# Patient Record
(demographics unavailable — no encounter records)

---

## 2024-12-30 NOTE — ASSESSMENT
[FreeTextEntry1] : 34 yo F with right wrist and hip pain s/p fall walking her dog 6 days ago, +tingling intermittently into radial 3 digits  discussed cons mgmt - rest, ice, NSAIDs prn thumb spica splint for right wrist wound care for abrasions on hand rto 2-3 weeks with hand specialist if pain/tingling persists

## 2024-12-30 NOTE — IMAGING
[Right] : right hip [AP] : anteroposterior [Lateral] : lateral [There are no fractures, subluxations or dislocations. No significant abnormalities are seen] : There are no fractures, subluxations or dislocations. No significant abnormalities are seen [de-identified] : right hand  superficial abrasion over thenar eminence and mid palm without signs of infection, minimal swelling, no ecchymosis, no deformity. ttp 1st ext compartment with +finkelstein's  full finger ROM with no fingertip to palm distance or extension lag full wrist ROM +phalens at the wrist  Motor and sensory intact  right hip large ecchymosis lateral hip over greater troch with ttp No buttock or groin tenderness to palpation Full range of motion with no pain 5/5 hip flexion, extension abduction and adduction Negative impingement; no groin pain with hip rotation Motor and sensory intact distally +2 DP and PT pulses mildly antalgic gait

## 2024-12-30 NOTE — HISTORY OF PRESENT ILLNESS
[6] : 6 [5] : 5 [Dull/Aching] : dull/aching [Sharp] : sharp [Intermittent] : intermittent [Nothing helps with pain getting better] : Nothing helps with pain getting better [] : no [FreeTextEntry1] : RT Wrist, rt hip [FreeTextEntry5] : RT Wrist and hip injury, Dog dragged her down fell injured her Wrist. 12/24. PT has a big Brusie on her RT Hip, Cuts and scrapes on hand and wrist. occasional wrist swelling and tingling in radial 3 digits. h/o softball injury many years ago with ulnar nerve damage but symptoms unchanged.

## 2025-03-27 NOTE — PHYSICAL EXAM
[Left] : left knee [5___] : hamstring 5[unfilled]/5 [Positive] : positive Stef [] : patient ambulates without assistive device [TWNoteComboBox7] : flexion 120 degrees

## 2025-03-27 NOTE — HISTORY OF PRESENT ILLNESS
[Dull/Aching] : dull/aching [Sharp] : sharp [Throbbing] : throbbing [Constant] : constant [Heat] : heat [de-identified] : 33yo F with left-sided lower back and left knee pain since sustaining a fall in December 2024. She states she had a flare up of pain yesterday with no new injury. Notes a tightness in the lower back. She also notes "crunchiness" in the left knee. States the knee occasionally feels unsteady. Notes occasional pain radiating down the leg, and numbness in the lower leg  [] : no [FreeTextEntry1] : lower left side of back, left hip, left knee [FreeTextEntry3] : 3/26/25 [FreeTextEntry5] : injury from falling in December [de-identified] : movement [de-identified] : injury from 12/24

## 2025-03-27 NOTE — ASSESSMENT
[FreeTextEntry1] : MDP and cyclobenzaprine rx sent. Instructed on No NSAIDs while on pack  Will obtain MRI L knee to eval for LMT.  f/up after MRI

## 2025-04-09 NOTE — PHYSICAL EXAM
[Positive] : positive Stef [Left] : left hip [5___] : adduction 5[unfilled]/5 [2+] : posterior tibialis pulse: 2+ [] : no erythema [TWNoteComboBox7] : flexion 120 degrees

## 2025-04-09 NOTE — DISCUSSION/SUMMARY
[de-identified] : The patient has one or more conditions that would benefit from physical therapy.  The physical therapy is requested to improve any deficit in pain, range of motion, strength and other problems in the affected body part(s) as noted in the physical examination above.  A prescription for physical therapy 2 - 3 times per week for 6 weeks was prescribed for the following body parts. Left knee and hip.   The patient's orthopaedic condition warrants consideration of intermittent use of over-the-counter medications such as advil, alleve, tylenol and similar agents.  The patient is aware to use the medications only as directed on the bottle and should contact a physician should they encounter any problems.  The patient's orthopaedic condition(s) warrants consideration of consistent or intermittent use of a prescription strength non-steroidal anti-inflammatory medication (MOBIC 15 MG PO QDAY PRN).  This medication is associated with risks including but not limited to gastrointestinal irritation, kidney damage, hypertension, and bleeding.    Although clinically indicated, the patient defers taking the medication at this time.  Follow up in 4-6 weeks.

## 2025-04-09 NOTE — DISCUSSION/SUMMARY
[de-identified] : The patient has one or more conditions that would benefit from physical therapy.  The physical therapy is requested to improve any deficit in pain, range of motion, strength and other problems in the affected body part(s) as noted in the physical examination above.  A prescription for physical therapy 2 - 3 times per week for 6 weeks was prescribed for the following body parts. Left knee and hip.   The patient's orthopaedic condition warrants consideration of intermittent use of over-the-counter medications such as advil, alleve, tylenol and similar agents.  The patient is aware to use the medications only as directed on the bottle and should contact a physician should they encounter any problems.  The patient's orthopaedic condition(s) warrants consideration of consistent or intermittent use of a prescription strength non-steroidal anti-inflammatory medication (MOBIC 15 MG PO QDAY PRN).  This medication is associated with risks including but not limited to gastrointestinal irritation, kidney damage, hypertension, and bleeding.    Although clinically indicated, the patient defers taking the medication at this time.  Follow up in 4-6 weeks.

## 2025-04-09 NOTE — HISTORY OF PRESENT ILLNESS
[Dull/Aching] : dull/aching [Throbbing] : throbbing [Constant] : constant [Heat] : heat [3] : 3 [0] : 0 [Localized] : localized [de-identified] : 4/9/25: New onset left-sided lower back and left knee pain since a fall in December 2024.  Notes a tightness in the lower back. Treated at [facility] by RAJAN Bourgeois. Back improving since injury with MDP. She also notes "crunchiness" in the left knee. Pain mostly anterior knee and lateral hip.  [] : no [FreeTextEntry1] : left knee [FreeTextEntry3] : 3/26/25 [FreeTextEntry5] : injury from falling in December. [FreeTextEntry6] : weakness, "giving out"  [de-identified] : movement, driving  [de-identified] : 3/27/25 [de-identified] : RAJAN Gee [de-identified] : x-ray @ [facility] & MRI @ [facility] [de-identified] : Cyclobenzaprine, methylPREDNISolone - Patient discontinued  MRI [de-identified] : NP

## 2025-04-09 NOTE — HISTORY OF PRESENT ILLNESS
[Dull/Aching] : dull/aching [Throbbing] : throbbing [Constant] : constant [Heat] : heat [3] : 3 [0] : 0 [Localized] : localized [de-identified] : 4/9/25: New onset left-sided lower back and left knee pain since a fall in December 2024.  Notes a tightness in the lower back. Treated at [facility] by RAJAN Bourgeois. Back improving since injury with MDP. She also notes "crunchiness" in the left knee. Pain mostly anterior knee and lateral hip.  [] : no [FreeTextEntry1] : left knee [FreeTextEntry3] : 3/26/25 [FreeTextEntry5] : injury from falling in December. [FreeTextEntry6] : weakness, "giving out"  [de-identified] : movement, driving  [de-identified] : 3/27/25 [de-identified] : RAJAN Gee [de-identified] : x-ray @ [facility] & MRI @ [facility] [de-identified] : Cyclobenzaprine, methylPREDNISolone - Patient discontinued  MRI [de-identified] : NP

## 2025-04-09 NOTE — ASSESSMENT
[FreeTextEntry1] : New onset left knee pain after fall in December. MRI left knee [facility] 4/6/25 shows partial thickness chondral fissures along the medial and lateral patellar, acute on chronic ACL sprain but ACL clinically intact.  Pain mostly anterior from chondral injury  Also having lateral hip pain. Likely hip bursitis.   NP.  Denies pmhx.

## 2025-04-09 NOTE — DATA REVIEWED
[MRI] : MRI [Left] : left [Knee] : knee [I independently reviewed and interpreted images and report] : I independently reviewed and interpreted images and report [FreeTextEntry1] : MRI left knee [facility] 4/6/25 shows partial thickness chondral fissures along the medial and lateral patellar, acute on chronic ACL sprain